# Patient Record
Sex: MALE | Race: OTHER | URBAN - METROPOLITAN AREA
[De-identification: names, ages, dates, MRNs, and addresses within clinical notes are randomized per-mention and may not be internally consistent; named-entity substitution may affect disease eponyms.]

---

## 2019-02-05 ENCOUNTER — OFFICE VISIT (OUTPATIENT)
Dept: FAMILY MEDICINE CLINIC | Age: 44
End: 2019-02-05

## 2019-02-05 ENCOUNTER — HOSPITAL ENCOUNTER (OUTPATIENT)
Dept: LAB | Age: 44
Discharge: HOME | End: 2019-02-05

## 2019-02-05 VITALS
WEIGHT: 179 LBS | TEMPERATURE: 98.2 F | HEIGHT: 65 IN | SYSTOLIC BLOOD PRESSURE: 140 MMHG | HEART RATE: 68 BPM | BODY MASS INDEX: 29.82 KG/M2 | DIASTOLIC BLOOD PRESSURE: 89 MMHG

## 2019-02-05 DIAGNOSIS — Z23 ENCOUNTER FOR IMMUNIZATION: ICD-10-CM

## 2019-02-05 DIAGNOSIS — M25.512 ACUTE PAIN OF LEFT SHOULDER: Primary | ICD-10-CM

## 2019-02-05 DIAGNOSIS — R07.89 LEFT-SIDED CHEST WALL PAIN: ICD-10-CM

## 2019-02-05 LAB
ANION GAP SERPL CALC-SCNC: 5 MMOL/L (ref 5–15)
BASOPHILS # BLD: 0.1 K/UL (ref 0–0.1)
BASOPHILS NFR BLD: 1 % (ref 0–1)
BUN SERPL-MCNC: 12 MG/DL (ref 6–20)
BUN/CREAT SERPL: 15 (ref 12–20)
CALCIUM SERPL-MCNC: 9.2 MG/DL (ref 8.5–10.1)
CHLORIDE SERPL-SCNC: 105 MMOL/L (ref 97–108)
CHOLEST SERPL-MCNC: 226 MG/DL
CO2 SERPL-SCNC: 29 MMOL/L (ref 21–32)
CREAT SERPL-MCNC: 0.81 MG/DL (ref 0.7–1.3)
DIFFERENTIAL METHOD BLD: NORMAL
EOSINOPHIL # BLD: 0.2 K/UL (ref 0–0.4)
EOSINOPHIL NFR BLD: 2 % (ref 0–7)
ERYTHROCYTE [DISTWIDTH] IN BLOOD BY AUTOMATED COUNT: 12.9 % (ref 11.5–14.5)
EST. AVERAGE GLUCOSE BLD GHB EST-MCNC: 123 MG/DL
GLUCOSE SERPL-MCNC: 97 MG/DL (ref 65–100)
HBA1C MFR BLD: 5.9 % (ref 4.2–6.3)
HCT VFR BLD AUTO: 45.4 % (ref 36.6–50.3)
HDLC SERPL-MCNC: 36 MG/DL
HDLC SERPL: 6.3 {RATIO} (ref 0–5)
HGB BLD-MCNC: 14.5 G/DL (ref 12.1–17)
IMM GRANULOCYTES # BLD AUTO: 0 K/UL (ref 0–0.04)
IMM GRANULOCYTES NFR BLD AUTO: 0 % (ref 0–0.5)
LDLC SERPL CALC-MCNC: 126.6 MG/DL (ref 0–100)
LIPID PROFILE,FLP: ABNORMAL
LYMPHOCYTES # BLD: 2 K/UL (ref 0.8–3.5)
LYMPHOCYTES NFR BLD: 26 % (ref 12–49)
MCH RBC QN AUTO: 31.5 PG (ref 26–34)
MCHC RBC AUTO-ENTMCNC: 31.9 G/DL (ref 30–36.5)
MCV RBC AUTO: 98.5 FL (ref 80–99)
MONOCYTES # BLD: 0.5 K/UL (ref 0–1)
MONOCYTES NFR BLD: 6 % (ref 5–13)
NEUTS SEG # BLD: 4.9 K/UL (ref 1.8–8)
NEUTS SEG NFR BLD: 64 % (ref 32–75)
NRBC # BLD: 0 K/UL (ref 0–0.01)
NRBC BLD-RTO: 0 PER 100 WBC
PLATELET # BLD AUTO: 289 K/UL (ref 150–400)
PMV BLD AUTO: 10.6 FL (ref 8.9–12.9)
POTASSIUM SERPL-SCNC: 4.3 MMOL/L (ref 3.5–5.1)
RBC # BLD AUTO: 4.61 M/UL (ref 4.1–5.7)
SODIUM SERPL-SCNC: 139 MMOL/L (ref 136–145)
TRIGL SERPL-MCNC: 317 MG/DL (ref ?–150)
VLDLC SERPL CALC-MCNC: 63.4 MG/DL
WBC # BLD AUTO: 7.6 K/UL (ref 4.1–11.1)

## 2019-02-05 PROCEDURE — 80061 LIPID PANEL: CPT

## 2019-02-05 PROCEDURE — 85025 COMPLETE CBC W/AUTO DIFF WBC: CPT

## 2019-02-05 PROCEDURE — 83036 HEMOGLOBIN GLYCOSYLATED A1C: CPT

## 2019-02-05 PROCEDURE — 80048 BASIC METABOLIC PNL TOTAL CA: CPT

## 2019-02-05 NOTE — PROGRESS NOTES
Patient given FLU vaccine. Patient denied fever and tolerated vaccine well. Patient verbalized understanding of possible side effects from vaccine and when to seek emergency medical treatment. [facility] information sheet given to patient. Patient had no adverse reaction at time of discharge. Patient declined the need for an . We reviewed the AVS instructions, ER precautions and the walk-in process for having xrays done. The patient was given the [address] [facility] centers resource sheet and understands to ask for a Care Card financial assistance application when he registers for the xray. He also understands that he will likely receive a bill for the xray prior to receiving an answer about his Care Card application. If this happens, he understands to call the phone number on the bill to let them know that he has applied for the Care Card. The patient expressed understanding of the instructions.  Manjinder Serrano RN

## 2019-02-05 NOTE — PATIENT INSTRUCTIONS
Dolor de pecho: Instrucciones de cuidado - [ Chest Pain: Care Instructions ] Instrucciones de cuidado El dolor de pecho puede tener muchas causas. Algunas no son graves y mejorarán por sí solas en pocos días. Jeni algunos tipos de dolor de pecho requieren más pruebas y [address] Es posible que marcos médico le haya recomendado fatoumata visita de seguimiento dentro de las 8 a 12 horas siguientes. Si no mejora, es posible que necesite [address] pruebas o [address] Aunque marcos médico le haya dado de haresh, es necesario que esté atento a cualquier problema que se presente. El médico le hizo un cuidadoso chequeo, jeni a veces los problemas pueden aparecer posteriormente. Si tiene nuevos síntomas o éstos no mejoran, obtenga atención médica de inmediato. Si tiene dolor o presión en el pecho que empeora o es diferente y que dura más de 5 minutos, o se desmayó (perdió el conocimiento), llame al 911 o busque otra ayuda de emergencia de inmediato. Acudir a fatoumata consulta médica es sólo un paso en marcos tratamiento. Aunque se sienta mejor, todavía deberá hacer lo que marcos médico le recomiende, obed asistir a todas las visitas de seguimiento sugeridas y bc los medicamentos exactamente [employer]	[place] fueron indicados. [place] le ayudará a recuperarse y prevenir problemas futuros. Cómo puede cuidarse en el hogar? · Descanse hasta que se sienta mejor. · Petros alma delia medicamentos exactamente obed le fueron recetados. Llame a marcos médico si mitzy estar teniendo problemas con marcos medicamento. · No conduzca después de bc un analgésico (medicamento para el dolor) recetado. Cuándo debe pedir ayuda? Llame al 911 si: 
  · Se desmayó (perdió el conocimiento).  
  · Tiene graves dificultades para respirar.  
  · Tiene síntomas de un ataque al corazón. Estos podrían incluir: ? [place] en el pecho, o fatoumata sensación extraña en el pecho. 
? Sudoración. ? Falta de aire. ? Náuseas o vómito. ? Dolor, presión o fatoumata sensación extraña en la espalda, el bronson, la mandíbula, la parte superior del abdomen o en eloy o ambos hombros o brazos. ? Aturdimiento o debilidad repentina. ? Latidos del corazón rápidos o irregulares. Después de llamar al 911, es posible que el operador le diga que mastique 1 aspirina para adultos o de 2 a 4 aspirinas de dosis baja. Espere a fatoumata ambulancia. No intente conducir usted mismo.  
 Llame hoy a marcos médico si: 
  · Tiene cualquier dificultad para respirar.  
  · El dolor en el pecho empeora.  
  · Siente mareos o aturdimiento, o que está a punto de desmayarse.  
  · No mejora obed se esperaba.  
  · Tiene dolor de pecho nuevo o diferente. Dónde puede encontrar más información en inglés? Eric Robles a http://colleen-tomas.info/. Escriba A120 en la búsqueda para aprender más acerca de \"Dolor de pecho: Instrucciones de cuidado - [ Chest Pain: Care Instructions ]. \" 
Revisado: 23 septiembre, 2018 Versión del contenido: 11.9 © 7238-2954 Healthwise, Incorporated. Las instrucciones de cuidado fueron adaptadas bajo licencia por [employer] (which disclaims liability or warranty for this information). Si usted tiene [place] afección médica o sobre estas instrucciones, siempre pregunte a marcos profesional de ankit. Healthwise, Incorporated niega toda garantía o responsabilidad por marcos uso de esta información. Estiramientos de hombros: Ejercicios - [ Shoulder Stretches: Exercises ] Instrucciones de cuidado Aquí se presentan algunos ejemplos de ejercicios para el hombro. Empiece cada ejercicio lentamente. Reduzca la intensidad del ejercicio si Trever Dandre a tener dolor. Marcos médico o fisioterapeuta le dirán cuándo puede comenzar con estos ejercicios y cuáles funcionarán mejor para usted. Cómo hacer los ejercicios Estiramiento del hombro 1.  Párese en un willie de luiza y coloque un brazo contra el willie de la luiza. El codo debe estar un poco más alto que el hombro. 2. Relaje los hombros a medida que se inclina hacia adelante, permitiendo que se estiren los músculos del pecho y del hombro. También puede girar marcos cuerpo ligeramente alejándolo de marcos brazo para estirar los músculos aún [place]. 3. Mantenga la posición entre 15 y 27 segundos. 4. Repita de 2 a 4 veces con cada brazo. Estiramiento del hombro y del pecho 1. Estiramiento del hombro y del pecho 2. Estando sentado, relaje la parte superior de marcos cuerpo de modo que se deje caer suavemente en marcos silla. 3. A medida que inhala, enderece la espalda y annabelle los [employer] 4. Jale suavemente los omóplatos hacia atrás y [place]. 5. Mantenga la posición sasha 15 a 30 segundos mientras respira normalmente. 6. Repita de 2 a 4 veces. Estiramiento sobre la [place] 1. [place] ambos brazos por encima de la kali. 2. Mantenga la posición entre 15 y 27 segundos. 3. Repita de 2 a 4 veces. La atención de seguimiento es fatoumata parte clave de marcos tratamiento y seguridad. Asegúrese de hacer y acudir a todas las citas, y llame a marcos médico si está teniendo problemas. También es fatoumata buena idea saber los resultados de alma delia exámenes y mantener fatoumata lista de los medicamentos que mar. Dónde puede encontrar más información en inglés? Theresa Lovettcon a http://james.info/. Emily Curtis C759 en la búsqueda para aprender más acerca de \"Estiramientos de hombros: Ejercicios - [ Shoulder Stretches: Exercises ]. \" 
Revisado: 20 septiembre, 2018 Versión del contenido: 11.9 © 1449-1406 [employer]. Las instrucciones de cuidado fueron adaptadas bajo licencia por [employer] (which disclaims liability or warranty for this information). Si usted tiene [place] afección médica o sobre estas instrucciones, siempre pregunte a marcos profesional de ankit.  [employer], [employer] niega toda garantía o responsabilidad por marcos uso de esta información. Artritis de hombro: Ejercicios - [ Shoulder Arthritis: Exercises ] Instrucciones de cuidado Estos son algunos ejemplos de ejercicios típicos de rehabilitación para marcos afección. Comience cada ejercicio lentamente. Reduzca la intensidad del ejercicio si Radha Florentino a sentir dolor. Marcos médico o el fisioterapeuta le dirán cuándo puede comenzar con estos ejercicios y cuáles funcionarán mejor para usted. Cómo se hacen los ejercicios Flexión del hombro (acostado) 1. Acuéstese boca arriba, sosteniendo la vara con ambas kenyatta. Las tushar deben estar hacia abajo mientras sostiene la vara. 2. Manteniendo los codos estirados, levante lentamente los brazos sobre la kali. Levántelos hasta que sienta un estiramiento en los hombros, en la espalda y en el pecho. 3. Mantenga la posición entre 15 y 27 segundos. 4. Repita de 2 a 4 veces. Rotación del hombro (acostado) 1. Acuéstese boca arriba. Sostenga la vara con ambas kenyatta, con los codos flexionados y las tushar [place]. 2. Mantenga los codos cerca del cuerpo y mueva la vara horizontalmente por michaela de marcos cuerpo hacia el brazo dolorido. 3. Mantenga la posición entre 8 y 12 segundos. 4. Repita de 2 a 4 veces. Rotación interna del hombro con fatoumata toalla 1. Sostenga fatoumata toalla por encima y por detrás de la kali con el brazo que no está dolorido. 2. Con el brazo dolorido, agarre la toalla por detrás de la espalda. 3. Con el brazo que [employer] de la [place], tire de la [facility] tran. Franci esto hasta que sienta un estiramiento en la parte frontal externa del hombro dolorido. 4. Mantenga la posición entre 15 y 27 segundos. 5. Repita de 2 a 4 veces. Compresión de omóplatos 1. Párese con los brazos a los lados y [employer] omóplatos. No levante los hombros mientras comprime los omóplatos. 2. Mantenga la posición sasha 6 segundos. 3. Repita de 8 a 12 veces. Remar con Martine Ferguson 1. Coloque la flannery alrededor de un objeto sólido aproximadamente a la altura de la cintura. (Un poste de cama funcionará whitney). Cada mano debe sostener un extremo de la flannery. 2. Con los codos a los lados y flexionados a 80 grados, tire de [address] atrás. Elda omóplatos deberían [employer] hacia el centro de la espalda. Regrese a la posición inicial. 
3. Repita de 8 a 12 veces. Ejercicio de fortalecimiento del rotador externo 1. Comience por atar un pedazo de material elástico para ejercicio a la perilla de fatoumata luiza. Puede usar un tubo quirúrgico o [employer]. (También puede sostener un extremo de la flannery en cada mano). 2. Párese o siéntese con el hombro relajado y el codo flexionado en ángulo de 90 grados. La parte superior del brazo debe descansar cómodamente en marcos costado. [employer] fatoumata toalla enrollada entre el codo y el cuerpo por comodidad. [place] ayudará a mantener el brazo contra el costado. 3. Sostenga un extremo de la flannery elástica con la mano del brazo dolorido. 4. Comience con el antebrazo a la altura del abdomen. Gire lentamente el antebrazo hacia afuera de marcos cuerpo. Mantenga el codo y el brazo apretados contra la toalla o contra el costado del cuerpo hasta que empiece a sentir tensión en el hombro. Mueva el brazo lentamente hacia donde [place] [address]. 5. Repita de 8 a 12 veces. Ejercicio de fortalecimiento del rotador interno 1. Comience por atar un pedazo de material elástico para ejercicio a la perilla de fatoumata luiza. Puede usar un tubo quirúrgico o [employer]. 2. Párese o siéntese con el hombro relajado y el codo flexionado en ángulo de 90 grados. El brazo debe descansar cómodamente al [employer]. [employer]priete fatoumata toalla enrollada entre el codo y el cuerpo por comodidad. [place] ayudará a mantener el brazo contra el costado. 3. Sostenga un extremo de la flannery elástica con la mano del brazo dolorido.  
4. Gire lentamente el antebrazo hacia marcos cuerpo hasta que toque el abdomen. Muévalo lentamente hacia donde Hammad Limbo. 5. Mantenga el codo y el brazo apretados firmemente contra la toalla enrollada o contra el costado. 6. Repita de 8 a 12 veces. Darcella Milo 1. Apóyese en fatoumata gomez o en el respaldo de fatoumata silla con marcos brazo margaret. Luego, inclínese un poco hacia adelante y deje que marcos brazo lesionado cuelgue hacia abajo. Vineet ejercicio no [place] los músculos del brazo. En marcos lugar, use alma delia piernas y caderas para crear un movimiento que franci que marcos brazo se balancee libremente. 2. Use el movimiento de alma delia caderas y piernas para guiar el brazo que se balancea de forma ligera hacia atrás y hacia adelante obed un péndulo (o obed la trompa de un elefante). Luego, guíelo en círculos que comienzan pequeños (aproximadamente del tamaño de un plato de comida). Franci crecer los círculos de manera gradual cada día a medida que el dolor se lo permita. 3. Franci vineet ejercicio sasha 5 minutos, de 5 a 7 veces al día. 4. A medida que tenga menos dolor, trate de inclinarse un poco más para hacer vineet ejercicio. [place] aumentará la cantidad de [employer]. La atención de seguimiento es fatoumata parte clave de marcos tratamiento y seguridad. Asegúrese de hacer y acudir a todas las citas, y llame a marcos médico si está teniendo problemas. También es fatoumata buena idea saber los resultados de alma delia exámenes y mantener fatoumata lista de los medicamentos que mar. Dónde puede encontrar más información en inglés? Tyler Aguirre a http://colleen-tomas.info/. Escriba H562 en la búsqueda para aprender más acerca de \"Artritis de hombro: Ejercicios - [ Shoulder Arthritis: Exercises ]. \" 
Revisado: 20 septiembre, 2018 Versión del contenido: 11.9 © 4723-4585 [employer]. Las instrucciones de cuidado fueron adaptadas bajo licencia por Good Help Connections (which disclaims liability or warranty for this information).  Si usted tiene preguntas sobre fatoumata afección médica o sobre estas instrucciones, siempre pregunte a marcos profesional de ankit. [facility] Incorporated niega toda garantía o responsabilidad por marcos uso de esta información.

## 2019-02-05 NOTE — PROGRESS NOTES
Johnny Pierre is a 38 y.o. male Issues discussed today include: Chief Complaint Patient presents with  Joint Pain Joint Pain, Arm Pain 1) Left arm and chest/trunk pain:  Started 8 months ago. Comes and goes. Occurs about once per week. Not worsening since onset, has been about the same. Worsened with movement of the left arm, overhead and heavy lifting. Can have achy pain in evening after hard day of work also. Denies associated SOB, palpitations, nausea, dizziness, sweating. Has tried advil with mod, transient relief. Denies known personal or family h/o HTN, DM, heart dz. Never smoked cigarettes. Drinks etoh occasionally 1-2 times annually. No drug use. Data reviewed or ordered today:    
 
Other problems include: There is no problem list on file for this patient. Medications: No current outpatient medications on file prior to visit. No current facility-administered medications on file prior to visit. Allergies: 
No Known Allergies LMP:  No LMP for male patient. Social History Socioeconomic History  Marital status:  Spouse name: Not on file  Number of children: Not on file  Years of education: Not on file  Highest education level: Not on file Social Needs  Financial resource strain: Not on file  Food insecurity - worry: Not on file  Food insecurity - inability: Not on file  Transportation needs - medical: Not on file  Transportation needs - non-medical: Not on file Occupational History  Not on file Tobacco Use  Smoking status: Never Smoker  Smokeless tobacco: Never Used Substance and Sexual Activity  Alcohol use: No  
  Frequency: Never  Drug use: No  
 Sexual activity: Not on file Other Topics Concern  Not on file Social History Narrative  Not on file No family history on file. Physical Exam  
Visit Vitals /89 (BP 1 Location: Left arm, BP Patient Position: Sitting) Pulse 68 Temp 98.2 °F (36.8 °C) (Oral) Ht 5' 4.57\" (1.64 m) Wt 179 lb (81.2 kg) BMI 30.19 kg/m² BP Readings from Last 3 Encounters:  
02/05/19 140/89 Constitutional: Appears well,  No acute distress, Vitals noted Psychiatric:  Affect normal, Alert and Oriented to person/place/time Eyes:  Conjunctiva clear, no drainage ENT:  External ears and nose normal, Mucous membranes moist 
Neck:  General inspection normal. Supple. Heart:  Normal HR, Normal S1 and S2,  Regular rhythm. No murmurs, rubs or gallops. Lungs:  Clear to auscultation, good respiratory effort, no wheezes, rales or rhonchi Shoulder: left Deformity: None ROM: 
Forward Flexion: Active: 180   Passive: 180 ER (0): Active: 45   Passive: 45 
IR (0): Active: Behind the body to the level T10 Abduction: Active: 180   Passive: 180 Palpation: 
AC tenderness: None SC tenderness: None Clavicle tenderness: None Biceps tenderness: None Strength (0-5/5): 
Deltoid  Anterior: 5/5 Deltoid  Posterior: 5/5 Deltoid  Mid: 5/5 Supraspinatus: 5/5 External rotation: 5/5 Internal rotation: 5/5 Rotator Cuff Exam: 
Neers sign: Negative Matt sign: Negative Painful Arc: Negative Lift-off sign / Belly Press: Negative Biceps/Labrum/AC Exam: 
Yergasons Test: Negative Speeds Test: Negative OCarrillos Sign: Negative Cross-chest adduction: Negative Neuro/Vascular: 
Pulses intact, no edema, and neurologically intact Extremities: Without edema, good peripheral pulses Skin:  Warm to palpation, without rashes Assessment/Plan: ICD-10-CM ICD-9-CM 1. Acute pain of left shoulder M25.512 719.41 XR SHOULDER LT AP/LAT MIN 2 V  
2. Left-sided chest wall pain R07.89 786.52 LIPID PANEL  
   METABOLIC PANEL, BASIC    HEMOGLOBIN A1C WITH EAG  
   CBC WITH AUTOMATED DIFF  
   XR SHOULDER LT AP/LAT MIN 2 V  
 
 
 1) Left shoulder pain - suspect muscle strain vs OA, rotator cuff testing neg, FROM 
- Xray left shoulder, if OA present can consider steroid injection  
- Naproxen 375mg bid prn (in place of advil) - AVS with shoulder exercises 2) left chest wall pain, seems associated with movement and shoulder pain also. MSK more likely, but cannot exclude cardiac etiology - Labs today for CV risk stratification - will call if abnormal or he can call in a week to know results - AVS with chest pain handout - ER precautions - go to ED if having persistent CP, SOB, palpitations, dizziness, nausea, sweating Ok for flu vaccine today Follow-up Disposition: 
Return 6-8 weeks if symptoms worsen or fail to improve. Jesús Dunlap MD 
[address] [place] [address] [employer]

## 2019-02-09 NOTE — PROGRESS NOTES
CBC, BMP and A1c wnl  Lipid panel with elevated TC, LDL - 10 yr ASCVD risk 3.6%, no need for medication at this time.  Will send letter with lifestyle recs